# Patient Record
Sex: FEMALE | Race: WHITE | Employment: OTHER | URBAN - METROPOLITAN AREA
[De-identification: names, ages, dates, MRNs, and addresses within clinical notes are randomized per-mention and may not be internally consistent; named-entity substitution may affect disease eponyms.]

---

## 2017-01-04 ENCOUNTER — HOSPITAL ENCOUNTER (OUTPATIENT)
Dept: MAMMOGRAPHY | Age: 62
Discharge: HOME | End: 2017-01-04
Attending: FAMILY MEDICINE
Payer: COMMERCIAL

## 2017-01-04 DIAGNOSIS — Z12.31 ENCOUNTER FOR MAMMOGRAM TO ESTABLISH BASELINE MAMMOGRAM: ICD-10-CM

## 2017-01-16 ENCOUNTER — TELEPHONE (OUTPATIENT)
Dept: SURGERY | Facility: CLINIC | Age: 62
End: 2017-01-16

## 2017-03-29 PROBLEM — L30.9 ECZEMA OF BOTH HANDS: Status: ACTIVE | Noted: 2017-03-29

## 2017-03-29 PROCEDURE — 88175 CYTOPATH C/V AUTO FLUID REDO: CPT | Performed by: FAMILY MEDICINE

## 2017-03-29 PROCEDURE — 87624 HPV HI-RISK TYP POOLED RSLT: CPT | Performed by: FAMILY MEDICINE

## 2017-05-13 ENCOUNTER — HOSPITAL ENCOUNTER (OUTPATIENT)
Dept: MAMMOGRAPHY | Age: 62
Discharge: HOME | End: 2017-05-13
Attending: FAMILY MEDICINE
Payer: COMMERCIAL

## 2017-05-13 DIAGNOSIS — Z12.31 ENCOUNTER FOR SCREENING MAMMOGRAM FOR MALIGNANT NEOPLASM OF BREAST: ICD-10-CM

## 2017-05-13 PROCEDURE — 77067 SCR MAMMO BI INCL CAD: CPT | Performed by: FAMILY MEDICINE

## 2017-05-13 PROCEDURE — 77063 BREAST TOMOSYNTHESIS BI: CPT | Performed by: FAMILY MEDICINE

## 2018-05-19 ENCOUNTER — HOSPITAL ENCOUNTER (OUTPATIENT)
Dept: MAMMOGRAPHY | Age: 63
Discharge: HOME | End: 2018-05-19
Attending: FAMILY MEDICINE
Payer: COMMERCIAL

## 2018-05-19 DIAGNOSIS — Z12.31 ENCOUNTER FOR SCREENING MAMMOGRAM FOR MALIGNANT NEOPLASM OF BREAST: ICD-10-CM

## 2018-05-19 PROCEDURE — 77063 BREAST TOMOSYNTHESIS BI: CPT | Performed by: FAMILY MEDICINE

## 2018-05-19 PROCEDURE — 77067 SCR MAMMO BI INCL CAD: CPT | Performed by: FAMILY MEDICINE

## 2018-06-09 ENCOUNTER — HOSPITAL ENCOUNTER (OUTPATIENT)
Dept: BONE DENSITY | Age: 63
Discharge: HOME | End: 2018-06-09
Attending: FAMILY MEDICINE
Payer: COMMERCIAL

## 2018-06-09 DIAGNOSIS — Z13.820 SCREENING FOR OSTEOPOROSIS: ICD-10-CM

## 2018-06-09 PROCEDURE — 77080 DXA BONE DENSITY AXIAL: CPT | Performed by: FAMILY MEDICINE

## 2018-06-15 NOTE — PROGRESS NOTES
Bone density shows osteopenia. T score of lumbar spine was -2.3, previously was -1.9 on 4/14/16. T score of femur bone at the hip (femoral neck) was 0.1, previously was 4/14/16.     It remains important to continue weightbearing exercise, calcium rich di

## 2018-09-30 PROBLEM — G89.29 CHRONIC BILATERAL LOW BACK PAIN WITH LEFT-SIDED SCIATICA: Status: ACTIVE | Noted: 2018-09-30

## 2018-09-30 PROBLEM — M54.42 CHRONIC BILATERAL LOW BACK PAIN WITH LEFT-SIDED SCIATICA: Status: ACTIVE | Noted: 2018-09-30

## 2019-05-20 ENCOUNTER — HOSPITAL ENCOUNTER (OUTPATIENT)
Dept: MAMMOGRAPHY | Age: 64
Discharge: HOME | End: 2019-05-20
Attending: FAMILY MEDICINE
Payer: COMMERCIAL

## 2019-05-20 DIAGNOSIS — Z12.39 SCREENING FOR BREAST CANCER: ICD-10-CM

## 2019-05-20 PROCEDURE — 77063 BREAST TOMOSYNTHESIS BI: CPT | Performed by: FAMILY MEDICINE

## 2019-05-20 PROCEDURE — 77067 SCR MAMMO BI INCL CAD: CPT | Performed by: FAMILY MEDICINE

## 2019-05-29 NOTE — H&P (VIEW-ONLY)
Desire Coto is a 62year old female who presents for a pre-operative physical exam. Patient is to have left anterior hip replacement, to be done by Dr. Preston Juárez at [facility] on June 24, 2019.       HPI:   The patient is a 18-year-old female who has h Levaquin [Levofloxa*        Comment:Joint pains  Pcn [Bicillin L-A]      UNKNOWN    Comment:Recently used for URI with no reaction   Past Medical History:   Diagnosis Date   • Asthma, mild intermittent 11/11/2009   • Diaphragmatic hernia without mention skin lesions  EYES:denies blurred vision or double vision  HEENT: denies nasal congestion, sinus pain or ST  LUNGS: denies shortness of breath with exertion  CARDIOVASCULAR: denies chest pain on exertion  GI: denies abdominal pain,denies heartburn  : den Absolute 2.52 1.30 - 6.70 10ˆ3/µL    Lymphocytes Absolute 3.59 0.90 - 4.00 10ˆ3/µL    Monocytes Absolute 0.55 0.10 - 0.60 10ˆ3/µL    Eosinophils Absolute 0.09 0.00 - 0.30 10ˆ3/µL    Basophils Absolute 0.07 0.00 - 0.10 10ˆ3/µL    nRBC Absolute 0.000 0.000 - in your health status prior to the day of your surgery, we are available to reevaluate you, if needed.    This consult to be sent back the referring physician, Dr. Matthew Monroy     In reviewing this note, please be advised that Dragon Voice Recognition software used - 1.20 mg/dL 0.39   ALKALINE PHOSPHATASE      50 - 130 U/L 68   AST (SGOT)      0 - 32 U/L 31   ALT (SGPT)      0 - 33 U/L 28   GFR CKD-EPI      >=60.00 mL/min/1.73 m² 103.33   prothrombin Time      8.9 - 11.9 sec 9.9   INR      0.9 - 1.2 ratio 0.9   APTT

## 2019-06-10 ENCOUNTER — HOSPITAL ENCOUNTER (OUTPATIENT)
Dept: PHYSICAL THERAPY | Facility: HOSPITAL | Age: 64
Discharge: HOME | End: 2019-06-10
Attending: ORTHOPAEDIC SURGERY
Payer: COMMERCIAL

## 2019-06-10 ENCOUNTER — LABORATORY ENCOUNTER (OUTPATIENT)
Dept: LAB | Facility: HOSPITAL | Age: 64
End: 2019-06-10
Attending: ORTHOPAEDIC SURGERY
Payer: COMMERCIAL

## 2019-06-10 DIAGNOSIS — M16.12 PRIMARY OSTEOARTHRITIS OF LEFT HIP: ICD-10-CM

## 2019-06-10 PROCEDURE — 86850 RBC ANTIBODY SCREEN: CPT

## 2019-06-10 PROCEDURE — 87081 CULTURE SCREEN ONLY: CPT

## 2019-06-10 PROCEDURE — 86900 BLOOD TYPING SEROLOGIC ABO: CPT

## 2019-06-10 PROCEDURE — 86901 BLOOD TYPING SEROLOGIC RH(D): CPT

## 2019-06-10 PROCEDURE — 81001 URINALYSIS AUTO W/SCOPE: CPT

## 2019-06-24 ENCOUNTER — ANESTHESIA (OUTPATIENT)
Dept: SURGERY | Facility: HOSPITAL | Age: 64
DRG: 470 | End: 2019-06-24
Payer: COMMERCIAL

## 2019-06-24 ENCOUNTER — ANESTHESIA EVENT (OUTPATIENT)
Dept: SURGERY | Facility: HOSPITAL | Age: 64
DRG: 470 | End: 2019-06-24
Payer: COMMERCIAL

## 2019-06-24 ENCOUNTER — HOSPITAL ENCOUNTER (INPATIENT)
Facility: HOSPITAL | Age: 64
LOS: 1 days | Discharge: HOME | DRG: 470 | End: 2019-06-25
Attending: ORTHOPAEDIC SURGERY | Admitting: ORTHOPAEDIC SURGERY
Payer: COMMERCIAL

## 2019-06-24 ENCOUNTER — APPOINTMENT (OUTPATIENT)
Dept: GENERAL RADIOLOGY | Facility: HOSPITAL | Age: 64
DRG: 470 | End: 2019-06-24
Attending: ORTHOPAEDIC SURGERY
Payer: COMMERCIAL

## 2019-06-24 DIAGNOSIS — M16.12 PRIMARY OSTEOARTHRITIS OF LEFT HIP: Primary | ICD-10-CM

## 2019-06-24 PROCEDURE — 97161 PT EVAL LOW COMPLEX 20 MIN: CPT

## 2019-06-24 PROCEDURE — 88341 IMHCHEM/IMCYTCHM EA ADD ANTB: CPT | Performed by: ORTHOPAEDIC SURGERY

## 2019-06-24 PROCEDURE — 88342 IMHCHEM/IMCYTCHM 1ST ANTB: CPT | Performed by: ORTHOPAEDIC SURGERY

## 2019-06-24 PROCEDURE — 3E0T3BZ INTRODUCTION OF ANESTHETIC AGENT INTO PERIPHERAL NERVES AND PLEXI, PERCUTANEOUS APPROACH: ICD-10-PCS | Performed by: ANESTHESIOLOGY

## 2019-06-24 PROCEDURE — 76942 ECHO GUIDE FOR BIOPSY: CPT | Performed by: ORTHOPAEDIC SURGERY

## 2019-06-24 PROCEDURE — 88311 DECALCIFY TISSUE: CPT | Performed by: ORTHOPAEDIC SURGERY

## 2019-06-24 PROCEDURE — 97530 THERAPEUTIC ACTIVITIES: CPT

## 2019-06-24 PROCEDURE — 0SRB01Z REPLACEMENT OF LEFT HIP JOINT WITH METAL SYNTHETIC SUBSTITUTE, OPEN APPROACH: ICD-10-PCS | Performed by: ORTHOPAEDIC SURGERY

## 2019-06-24 PROCEDURE — 88304 TISSUE EXAM BY PATHOLOGIST: CPT | Performed by: ORTHOPAEDIC SURGERY

## 2019-06-24 PROCEDURE — 76000 FLUOROSCOPY <1 HR PHYS/QHP: CPT | Performed by: ORTHOPAEDIC SURGERY

## 2019-06-24 DEVICE — ARTICUL/EZE FEMORAL HEAD DIAMETER 32MM +5 12/14 TAPER
Type: IMPLANTABLE DEVICE | Site: HIP | Status: FUNCTIONAL
Brand: ARTICUL/EZE

## 2019-06-24 DEVICE — CORAIL HIP SYSTEM CEMENTLESS FEMORAL STEM 12/14 AMT 135 DEGREES KA SIZE 9 HA COATED STANDARD COLLAR
Type: IMPLANTABLE DEVICE | Site: HIP | Status: FUNCTIONAL
Brand: CORAIL

## 2019-06-24 DEVICE — PINNACLE POROCOAT ACETABULAR SHELL SECTOR II 48MM OD
Type: IMPLANTABLE DEVICE | Site: HIP | Status: FUNCTIONAL
Brand: PINNACLE POROCOAT

## 2019-06-24 DEVICE — PINNACLE HIP SOLUTIONS ALTRX POLYETHYLENE ACETABULAR LINER NEUTRAL 32MM ID 48MM OD
Type: IMPLANTABLE DEVICE | Site: HIP | Status: FUNCTIONAL
Brand: PINNACLE ALTRX

## 2019-06-24 RX ORDER — DEXAMETHASONE SODIUM PHOSPHATE 4 MG/ML
4 VIAL (ML) INJECTION AS NEEDED
Status: DISCONTINUED | OUTPATIENT
Start: 2019-06-24 | End: 2019-06-24 | Stop reason: HOSPADM

## 2019-06-24 RX ORDER — CLINDAMYCIN PHOSPHATE 900 MG/50ML
INJECTION INTRAVENOUS
Status: DISPENSED
Start: 2019-06-24 | End: 2019-06-24

## 2019-06-24 RX ORDER — DIPHENHYDRAMINE HYDROCHLORIDE 50 MG/ML
12.5 INJECTION INTRAMUSCULAR; INTRAVENOUS EVERY 4 HOURS PRN
Status: DISCONTINUED | OUTPATIENT
Start: 2019-06-24 | End: 2019-06-25

## 2019-06-24 RX ORDER — METOCLOPRAMIDE HYDROCHLORIDE 5 MG/ML
10 INJECTION INTRAMUSCULAR; INTRAVENOUS EVERY 6 HOURS PRN
Status: DISCONTINUED | OUTPATIENT
Start: 2019-06-24 | End: 2019-06-25

## 2019-06-24 RX ORDER — SODIUM CHLORIDE 9 MG/ML
INJECTION, SOLUTION INTRAVENOUS CONTINUOUS
Status: DISCONTINUED | OUTPATIENT
Start: 2019-06-24 | End: 2019-06-25

## 2019-06-24 RX ORDER — ASPIRIN 325 MG
325 TABLET ORAL 2 TIMES DAILY
Status: DISCONTINUED | OUTPATIENT
Start: 2019-06-24 | End: 2019-06-25

## 2019-06-24 RX ORDER — SODIUM PHOSPHATE, DIBASIC AND SODIUM PHOSPHATE, MONOBASIC 7; 19 G/133ML; G/133ML
1 ENEMA RECTAL ONCE AS NEEDED
Status: DISCONTINUED | OUTPATIENT
Start: 2019-06-24 | End: 2019-06-25

## 2019-06-24 RX ORDER — MONTELUKAST SODIUM 10 MG/1
10 TABLET ORAL DAILY PRN
Status: DISCONTINUED | OUTPATIENT
Start: 2019-06-24 | End: 2019-06-25

## 2019-06-24 RX ORDER — DIPHENHYDRAMINE HYDROCHLORIDE 50 MG/ML
25 INJECTION INTRAMUSCULAR; INTRAVENOUS ONCE AS NEEDED
Status: ACTIVE | OUTPATIENT
Start: 2019-06-24 | End: 2019-06-24

## 2019-06-24 RX ORDER — DOCUSATE SODIUM 100 MG/1
100 CAPSULE, LIQUID FILLED ORAL 2 TIMES DAILY
Qty: 60 CAPSULE | Refills: 0 | Status: SHIPPED | OUTPATIENT
Start: 2019-06-24 | End: 2020-04-13 | Stop reason: ALTCHOICE

## 2019-06-24 RX ORDER — ACETAMINOPHEN 325 MG/1
650 TABLET ORAL 4 TIMES DAILY
Status: COMPLETED | OUTPATIENT
Start: 2019-06-24 | End: 2019-06-24

## 2019-06-24 RX ORDER — CLINDAMYCIN PHOSPHATE 900 MG/50ML
900 INJECTION INTRAVENOUS ONCE
Status: DISCONTINUED | OUTPATIENT
Start: 2019-06-24 | End: 2019-06-24 | Stop reason: HOSPADM

## 2019-06-24 RX ORDER — PROCHLORPERAZINE EDISYLATE 5 MG/ML
10 INJECTION INTRAMUSCULAR; INTRAVENOUS EVERY 6 HOURS PRN
Status: DISCONTINUED | OUTPATIENT
Start: 2019-06-24 | End: 2019-06-25

## 2019-06-24 RX ORDER — DOCUSATE SODIUM 100 MG/1
100 CAPSULE, LIQUID FILLED ORAL 2 TIMES DAILY
Status: DISCONTINUED | OUTPATIENT
Start: 2019-06-24 | End: 2019-06-25

## 2019-06-24 RX ORDER — SODIUM CHLORIDE, SODIUM LACTATE, POTASSIUM CHLORIDE, CALCIUM CHLORIDE 600; 310; 30; 20 MG/100ML; MG/100ML; MG/100ML; MG/100ML
INJECTION, SOLUTION INTRAVENOUS CONTINUOUS
Status: DISCONTINUED | OUTPATIENT
Start: 2019-06-24 | End: 2019-06-24 | Stop reason: HOSPADM

## 2019-06-24 RX ORDER — CLINDAMYCIN PHOSPHATE 900 MG/50ML
900 INJECTION INTRAVENOUS EVERY 8 HOURS
Status: COMPLETED | OUTPATIENT
Start: 2019-06-24 | End: 2019-06-25

## 2019-06-24 RX ORDER — CELECOXIB 200 MG/1
200 CAPSULE ORAL 2 TIMES DAILY
Qty: 60 CAPSULE | Refills: 0 | Status: SHIPPED | OUTPATIENT
Start: 2019-06-24 | End: 2019-07-30

## 2019-06-24 RX ORDER — HYDROMORPHONE HYDROCHLORIDE 1 MG/ML
INJECTION, SOLUTION INTRAMUSCULAR; INTRAVENOUS; SUBCUTANEOUS
Status: COMPLETED
Start: 2019-06-24 | End: 2019-06-24

## 2019-06-24 RX ORDER — ZOLPIDEM TARTRATE 5 MG/1
5 TABLET ORAL NIGHTLY PRN
Status: DISCONTINUED | OUTPATIENT
Start: 2019-06-24 | End: 2019-06-25

## 2019-06-24 RX ORDER — FAMOTIDINE 20 MG/1
40 TABLET ORAL NIGHTLY PRN
Status: DISCONTINUED | OUTPATIENT
Start: 2019-06-24 | End: 2019-06-25

## 2019-06-24 RX ORDER — OXYCODONE HYDROCHLORIDE 15 MG/1
15 TABLET ORAL EVERY 4 HOURS PRN
Status: ACTIVE | OUTPATIENT
Start: 2019-06-24 | End: 2019-06-25

## 2019-06-24 RX ORDER — TIZANIDINE 4 MG/1
4 TABLET ORAL 3 TIMES DAILY PRN
Status: DISCONTINUED | OUTPATIENT
Start: 2019-06-24 | End: 2019-06-25

## 2019-06-24 RX ORDER — MIDAZOLAM HYDROCHLORIDE 1 MG/ML
1 INJECTION INTRAMUSCULAR; INTRAVENOUS EVERY 5 MIN PRN
Status: DISCONTINUED | OUTPATIENT
Start: 2019-06-24 | End: 2019-06-24 | Stop reason: HOSPADM

## 2019-06-24 RX ORDER — KETOROLAC TROMETHAMINE 30 MG/ML
30 INJECTION, SOLUTION INTRAMUSCULAR; INTRAVENOUS EVERY 6 HOURS
Status: COMPLETED | OUTPATIENT
Start: 2019-06-24 | End: 2019-06-25

## 2019-06-24 RX ORDER — POLYETHYLENE GLYCOL 3350 17 G/17G
17 POWDER, FOR SOLUTION ORAL DAILY PRN
Status: DISCONTINUED | OUTPATIENT
Start: 2019-06-24 | End: 2019-06-25

## 2019-06-24 RX ORDER — OXYCODONE HYDROCHLORIDE 5 MG/1
5 TABLET ORAL EVERY 4 HOURS PRN
Status: ACTIVE | OUTPATIENT
Start: 2019-06-24 | End: 2019-06-25

## 2019-06-24 RX ORDER — ACETAMINOPHEN 325 MG/1
TABLET ORAL
Status: COMPLETED
Start: 2019-06-24 | End: 2019-06-24

## 2019-06-24 RX ORDER — HYDROMORPHONE HYDROCHLORIDE 1 MG/ML
0.8 INJECTION, SOLUTION INTRAMUSCULAR; INTRAVENOUS; SUBCUTANEOUS EVERY 2 HOUR PRN
Status: DISCONTINUED | OUTPATIENT
Start: 2019-06-24 | End: 2019-06-25

## 2019-06-24 RX ORDER — SODIUM CHLORIDE, SODIUM LACTATE, POTASSIUM CHLORIDE, CALCIUM CHLORIDE 600; 310; 30; 20 MG/100ML; MG/100ML; MG/100ML; MG/100ML
INJECTION, SOLUTION INTRAVENOUS CONTINUOUS
Status: DISCONTINUED | OUTPATIENT
Start: 2019-06-24 | End: 2019-06-25

## 2019-06-24 RX ORDER — BISACODYL 10 MG
10 SUPPOSITORY, RECTAL RECTAL
Status: DISCONTINUED | OUTPATIENT
Start: 2019-06-24 | End: 2019-06-25

## 2019-06-24 RX ORDER — HYDROCODONE BITARTRATE AND ACETAMINOPHEN 10; 325 MG/1; MG/1
1 TABLET ORAL EVERY 4 HOURS PRN
Status: DISCONTINUED | OUTPATIENT
Start: 2019-06-25 | End: 2019-06-25

## 2019-06-24 RX ORDER — HYDROMORPHONE HYDROCHLORIDE 1 MG/ML
0.4 INJECTION, SOLUTION INTRAMUSCULAR; INTRAVENOUS; SUBCUTANEOUS EVERY 2 HOUR PRN
Status: DISCONTINUED | OUTPATIENT
Start: 2019-06-24 | End: 2019-06-25

## 2019-06-24 RX ORDER — ONDANSETRON 2 MG/ML
4 INJECTION INTRAMUSCULAR; INTRAVENOUS EVERY 4 HOURS PRN
Status: DISCONTINUED | OUTPATIENT
Start: 2019-06-24 | End: 2019-06-25

## 2019-06-24 RX ORDER — HYDROCODONE BITARTRATE AND ACETAMINOPHEN 10; 325 MG/1; MG/1
2 TABLET ORAL EVERY 4 HOURS PRN
Status: DISCONTINUED | OUTPATIENT
Start: 2019-06-25 | End: 2019-06-25

## 2019-06-24 RX ORDER — DIPHENHYDRAMINE HCL 25 MG
25 CAPSULE ORAL EVERY 4 HOURS PRN
Status: DISCONTINUED | OUTPATIENT
Start: 2019-06-24 | End: 2019-06-25

## 2019-06-24 RX ORDER — TRAMADOL HYDROCHLORIDE 50 MG/1
50 TABLET ORAL EVERY 6 HOURS
Status: DISCONTINUED | OUTPATIENT
Start: 2019-06-24 | End: 2019-06-25

## 2019-06-24 RX ORDER — NALOXONE HYDROCHLORIDE 0.4 MG/ML
80 INJECTION, SOLUTION INTRAMUSCULAR; INTRAVENOUS; SUBCUTANEOUS AS NEEDED
Status: DISCONTINUED | OUTPATIENT
Start: 2019-06-24 | End: 2019-06-24 | Stop reason: HOSPADM

## 2019-06-24 RX ORDER — HYDROCODONE BITARTRATE AND ACETAMINOPHEN 5; 325 MG/1; MG/1
1 TABLET ORAL EVERY 4 HOURS PRN
Qty: 40 TABLET | Refills: 0 | Status: SHIPPED | OUTPATIENT
Start: 2019-06-24 | End: 2019-06-25

## 2019-06-24 RX ORDER — SCOLOPAMINE TRANSDERMAL SYSTEM 1 MG/1
1 PATCH, EXTENDED RELEASE TRANSDERMAL ONCE
Status: DISCONTINUED | OUTPATIENT
Start: 2019-06-24 | End: 2019-06-25

## 2019-06-24 RX ORDER — HYDROMORPHONE HYDROCHLORIDE 1 MG/ML
0.2 INJECTION, SOLUTION INTRAMUSCULAR; INTRAVENOUS; SUBCUTANEOUS EVERY 2 HOUR PRN
Status: DISCONTINUED | OUTPATIENT
Start: 2019-06-24 | End: 2019-06-25

## 2019-06-24 RX ORDER — ONDANSETRON 2 MG/ML
4 INJECTION INTRAMUSCULAR; INTRAVENOUS AS NEEDED
Status: DISCONTINUED | OUTPATIENT
Start: 2019-06-24 | End: 2019-06-24 | Stop reason: HOSPADM

## 2019-06-24 RX ORDER — OXYCODONE HYDROCHLORIDE 10 MG/1
10 TABLET ORAL EVERY 4 HOURS PRN
Status: ACTIVE | OUTPATIENT
Start: 2019-06-24 | End: 2019-06-25

## 2019-06-24 RX ORDER — MEPERIDINE HYDROCHLORIDE 25 MG/ML
12.5 INJECTION INTRAMUSCULAR; INTRAVENOUS; SUBCUTANEOUS AS NEEDED
Status: DISCONTINUED | OUTPATIENT
Start: 2019-06-24 | End: 2019-06-24 | Stop reason: HOSPADM

## 2019-06-24 RX ORDER — ACETAMINOPHEN 325 MG/1
650 TABLET ORAL ONCE
Status: COMPLETED | OUTPATIENT
Start: 2019-06-24 | End: 2019-06-24

## 2019-06-24 RX ORDER — SENNOSIDES 8.6 MG
17.2 TABLET ORAL NIGHTLY
Status: DISCONTINUED | OUTPATIENT
Start: 2019-06-24 | End: 2019-06-25

## 2019-06-24 RX ORDER — ACETAMINOPHEN 500 MG
1000 TABLET ORAL ONCE
Status: DISCONTINUED | OUTPATIENT
Start: 2019-06-24 | End: 2019-06-24 | Stop reason: HOSPADM

## 2019-06-24 RX ORDER — MULTIVITAMIN WITH FOLIC ACID 400 MCG
1 TABLET ORAL DAILY
Status: ON HOLD | COMMUNITY
End: 2019-06-24

## 2019-06-24 NOTE — PHYSICAL THERAPY NOTE
PT orders received, chart reviewed, and spoke with RN. Per RN, pt is unable to lift the leg up. Will hold PT eval until pt is appropriate. RN aware.  CM

## 2019-06-24 NOTE — PLAN OF CARE
Patient a/ox4, rates pain 3-5/10 , started on IV medications, will start PO when tolerating oral intake. PT attempted to get patient up and out of bed, block and spinal still in effect, per PT, unsafe to get patient up at this time.  Will attempt to get p

## 2019-06-24 NOTE — ANESTHESIA POSTPROCEDURE EVALUATION
[address] Patient Status:  Surgery Admit - Inpt   Age/Gender 62year old female MRN [id number]   [facility] SURGERY Attending Nicky May MD   Hosp Day # 0 PCP Evy Ruelas MD       Anesthesia Post-op Note    Proce

## 2019-06-24 NOTE — PHYSICAL THERAPY NOTE
PHYSICAL THERAPY HIP EVALUATION - INPATIENT     Room Number: 385/385-A  Evaluation Date: 6/24/2019  Type of Evaluation: Initial  Physician Order: PT Eval and Treat    Presenting Problem: L anterior total hip replacement  Reason for Therapy: Mobility Dysfun 16  Railing: Yes    Lives With: Spouse  Drives: Yes  Patient Owned Equipment: Rolling walker  Patient Regularly Uses: Reading glasses    Prior Level of [place]: [facility] are multiple flights of stairs.  Prior Little   -   Moving from lying on back to sitting on the side of the bed?: A Little   How much help from another person does the patient currently need. ..   -   Moving to and from a bed to a chair (including a wheelchair)?: A Little   -   Need to walk in h after session. Increased time spent on discussion with pt regarding inpatient PT goals, group therapy sessions, and DC recommendation of home with HHPT. Pt in understanding.      Exercise/Education Provided:  Bed mobility  Body mechanics  Functional act Goals: 5      CURRENT GOALS  Goal #1  Patient is able to demonstrate supine - sit EOB @ level: supervision     Goal #2  Patient is able to demonstrate transfers Sit to/from Stand at assistance level: supervison     Goal #3    Patient is able to ambulate 15

## 2019-06-24 NOTE — OPERATIVE REPORT
TOTAL HIP REPLACEMENT OPERATIVE REPORT    Hilton Brown       [id number]     3/6/1955    PRE-OP DX:  LEFT HIP PRIMARY OSTEOARTHRITIS  POST-OP DX:  LEFT HIP PRIMARY OSTEOARTHRITIS  PROCEDURE:  DIRECT ANTERIOR LEFT TOTAL HIP REPLACEMENT  SURGEON:  Annie Johnson OSTEOTOMY WAS MADE. FEMORAL HEAD WAS REMOVED WITH A CORK SCREW. POSTERIOR AND INFERIOR ACETABULAR RETRACTORS WERE PLACED CAREFULLY. GOOD ACETABULAR EXPOSURE WAS OBTAINED. LABRAL TISSUE WAS EXCISED.   MEDIAL WALL WAS IDENTIFIED AND CLEARED OF SOFT TIS STERILE CONDITION. HIP WAS FLEXED TO 90 AND ADD/IR TO 45 DEG. HIP WAS FULLY EXTENDED AND ER TO 90. HIP WAS FELT TO BE STABLE WITH GOOD TENSION. ALL THE TRIAL IMPLANTS WERE REMOVED. WOUND WAS IRRIGATED COPIOUSLY. HEMOSTASIS WAS OBTAINED.   Nikki Diaz

## 2019-06-24 NOTE — PROGRESS NOTES
Sonido Crowe MD       SURGERY, ORTHOPEDIC   Primary osteoarthritis of left hip       Dx   Follow - Up         Reason for Visit               Reason for Visit     Follow - Up F/U Left hip OA      Progress Notes        Patient follows up for her left hip I feel that she is a candidate for hip replacement surgery. She has failed reasonable conservative care. Explained to her what hip replacement means. Hospital course, recovery process, expected and limitations after surgery were discussed.   Risks of the

## 2019-06-24 NOTE — INTERVAL H&P NOTE
Pre-op Diagnosis: Primary osteoarthritis of left hip [M16.12]    The above referenced H&P was reviewed by Nivia Desir MD on 6/24/2019, the patient was examined and no significant changes have occurred in the patient's condition since the H&P was performed.

## 2019-06-24 NOTE — ANESTHESIA PREPROCEDURE EVALUATION
PRE-OP EVALUATION    Patient Name: Hair Tomas    Pre-op Diagnosis: Primary osteoarthritis of left hip [M16.12]    Procedure(s):  LEFT ANTERIOR HIP REPLACEMENT    Surgeon(s) and Role:     Darlene Rowley MD - Primary    Pre-op vitals reviewed.   Temp: Take 1 tablet by mouth 3 (three) times daily as needed for Pain. Disp: 60 tablet Rfl: 1   Probiotic Product (PROBIOTIC DAILY OR) Take 1 capsule by mouth daily.  Disp:  Rfl:    Ranitidine HCl (ZANTAC) 300 MG Oral Tab Take 1 tablet by mouth nightly as needed 05/29/2019    MCHC 32.5 05/29/2019    RDW 13.4 05/29/2019     05/29/2019     Lab Results   Component Value Date     05/29/2019    K 3.87 05/29/2019     05/29/2019    CO2 29.4 (H) 05/29/2019    BUN 12.0 05/29/2019    CREATSERUM 0.50 05/29

## 2019-06-25 VITALS
BODY MASS INDEX: 27.27 KG/M2 | TEMPERATURE: 99 F | HEART RATE: 89 BPM | RESPIRATION RATE: 16 BRPM | OXYGEN SATURATION: 95 % | DIASTOLIC BLOOD PRESSURE: 63 MMHG | SYSTOLIC BLOOD PRESSURE: 104 MMHG | HEIGHT: 62.5 IN | WEIGHT: 152 LBS

## 2019-06-25 PROCEDURE — 97535 SELF CARE MNGMENT TRAINING: CPT

## 2019-06-25 PROCEDURE — 97116 GAIT TRAINING THERAPY: CPT

## 2019-06-25 PROCEDURE — 97530 THERAPEUTIC ACTIVITIES: CPT

## 2019-06-25 PROCEDURE — 97150 GROUP THERAPEUTIC PROCEDURES: CPT

## 2019-06-25 PROCEDURE — 85027 COMPLETE CBC AUTOMATED: CPT | Performed by: ORTHOPAEDIC SURGERY

## 2019-06-25 PROCEDURE — 97165 OT EVAL LOW COMPLEX 30 MIN: CPT

## 2019-06-25 RX ORDER — ACETAMINOPHEN AND CODEINE PHOSPHATE 300; 30 MG/1; MG/1
2 TABLET ORAL EVERY 4 HOURS PRN
Status: DISCONTINUED | OUTPATIENT
Start: 2019-06-25 | End: 2019-06-25

## 2019-06-25 RX ORDER — TIZANIDINE 2 MG/1
1 TABLET ORAL 3 TIMES DAILY PRN
Status: DISCONTINUED | OUTPATIENT
Start: 2019-06-25 | End: 2019-06-25

## 2019-06-25 RX ORDER — TRAMADOL HYDROCHLORIDE 50 MG/1
50 TABLET ORAL EVERY 6 HOURS PRN
Status: DISCONTINUED | OUTPATIENT
Start: 2019-06-25 | End: 2019-06-25

## 2019-06-25 RX ORDER — ACETAMINOPHEN AND CODEINE PHOSPHATE 300; 30 MG/1; MG/1
1-2 TABLET ORAL EVERY 4 HOURS PRN
Qty: 40 TABLET | Refills: 0 | Status: SHIPPED | OUTPATIENT
Start: 2019-06-25 | End: 2020-04-13 | Stop reason: ALTCHOICE

## 2019-06-25 RX ORDER — ACETAMINOPHEN AND CODEINE PHOSPHATE 300; 30 MG/1; MG/1
1 TABLET ORAL EVERY 4 HOURS PRN
Status: DISCONTINUED | OUTPATIENT
Start: 2019-06-25 | End: 2019-06-25

## 2019-06-25 NOTE — PROGRESS NOTES
[facility] Hospitalist Progress Note     [facility]      SUBJECTIVE:  No CP, SOB  Had some nausea with norco, has tolerated tylenol 3 previouusly  Did have BM, tolerating diet    OBJECTIVE:  Temp:  [97.6 °F (36.4 °C)-99.3 °F (37.4 °C)] 98.7 °F (37.1 °C)  P surgical planning. If additional diagnostic imaging needed, consider followup with standard imaging exams.     Dictated by: Nya Pérez MD on 6/24/2019 at 11:02     Approved by: Nya Pérez MD               Meds:     Prior to Admission Medications: 0.9% NaCl infusion  Intravenous Continuous   aspirin tab 325 mg 325 mg Oral BID   HYDROmorphone HCl (DILAUDID) 1 MG/ML injection 0.2 mg 0.2 mg Intravenous Q2H PRN   Or      HYDROmorphone HCl (DILAUDID) 1 MG/ML injection 0.4 mg 0.4 mg Intravenous Q2H PRN

## 2019-06-25 NOTE — PHYSICAL THERAPY NOTE
PHYSICAL THERAPY HIP TREATMENT NOTE - INPATIENT      Room Number: 385/385-A     Session: 2 and 3  Number of Visits to Meet Established Goals: 5    Presenting Problem: L anterior total hip replacement    Problem List  Active Problems:    Primary osteoarthri Lower Extremity: Weight Bearing as Tolerated    PAIN ASSESSMENT   Rating: 3  Location: left hip  Management Techniques:  Activity promotion    BALANCE  Static Sitting: Good  Dynamic Sitting: Good  Static Standing: Fair -  Dynamic Standing: Fair -  ACTIVITY 300 feet with sup assist. Pt needs cues to correct gait pattern and cues to maintain hip precautions during mobility. Hip precautions recalled. Pt was trained on stairs with sup for safety.  Pt negotiated  8 number of steps with rod rails via step to gait Goal #4     Patient will negotiate 4 stairs/one curb w/ assistive device and supervision  Met    Goal #5     Patient verbalizes and/or demonstrates all precautions and safety concerns independently  Met    Goal #6           Goal Comments: Goals establish

## 2019-06-25 NOTE — PROGRESS NOTES
[address] Patient Status:  Inpatient    3/6/1955 MRN [id number]   [facility] 3SW-A Attending Rosa Tobias MD   [address] Day # 1 PCP Idania Moran MD         S:  Patient doing well. No nausea. No SOB, CP or calf pain.

## 2019-06-25 NOTE — CONSULTS
General Medicine Consult      Reason for consult: post-op medical management     Consulted by: Dr. Lillie Perez    PCP: Jimenez Lomeli MD      History of Present Illness: Patient is a 62year old female with PMH sig for seasonal allergies, GERD, OA who presented for mouth daily. Disp:  Rfl:    Cholecalciferol (VITAMIN D-3 OR) Take 1 tablet by mouth daily. Disp:  Rfl:    HYDROcodone-acetaminophen (NORCO) 5-325 MG Oral Tab Take 1 tablet by mouth every 4 (four) hours as needed for Pain.  Disp: 40 tablet Rfl: 0   docusate HCl, Prochlorperazine Edisylate, diphenhydrAMINE HCl, diphenhydrAMINE **OR** diphenhydrAMINE HCl, tiZANidine HCl, oxyCODONE HCl **OR** oxyCODONE HCl **OR** oxyCODONE HCl, HYDROmorphone HCl **OR** HYDROmorphone HCl **OR** HYDROmorphone HCl, [START ON 6/25/2 results for input(s): WBC, HGB, MCV, PLT, BAND, INR in the last 168 hours. Invalid input(s): LYM#, MONO#, BASOS#, EOSIN#    No results for input(s): NA, K, CL, CO2, BUN, CREATSERUM, GLU, CA, CAION, MG, PHOS in the last 168 hours.     No results for input

## 2019-06-25 NOTE — CM/SW NOTE
06/25/19 1000   CM/SW Screening   Referral [address] staff; Chart review;Nursing rounds   Patient's Home Environment [facility]   Number of Levels in Home Other (comment)  (multi)   Number of Stair in Home 7   Patient lives

## 2019-06-25 NOTE — PHYSICAL THERAPY NOTE
PHYSICAL THERAPY HIP TREATMENT NOTE - INPATIENT      Room Number: 385/385-A     Session: 1  Number of Visits to Meet Established Goals: 5    Presenting Problem: L anterior total hip replacement    Problem List  Active Problems:    Primary osteoarthritis of Extremity: Weight Bearing as Tolerated    PAIN ASSESSMENT   Ratin  Location: left hip  Management Techniques:  Activity promotion    BALANCE  Static Sitting: Good  Dynamic Sitting: Good  Static [address] -  Dynamic [address]  [facility] in chair; With [facility] staff;Call light within reach; Needs met; All patient questions and concerns addressed(woriking with OT)    ASSESSMENT   Pt demonstrating improved left LE control and now appears appropriate for group PT this AM, is doing well.   Pt cont to p

## 2019-06-25 NOTE — PLAN OF CARE
Left hip with Aquacel dressing in place, clean, dry and intact. Verbalized has full sensation of her left lower leg,  denies numbness or tingling sensation to left leg.   Given Tramadol this morning, well tolerated, verbalized has minimal pain to left hip

## 2019-06-25 NOTE — OCCUPATIONAL THERAPY NOTE
OCCUPATIONAL THERAPY QUICK EVALUATION - INPATIENT    Room Number: 385/385-A  Evaluation Date: 6/25/2019     Type of Evaluation: Quick Eval  Presenting Problem: s/p L LILIA 6/24/19    Physician Order: IP Consult to Occupational Therapy  Reason for Therapy:  A SITUATION  Type of Home: Townhouse  Home Layout: Multi-level  Lives With: Spouse    Toilet and Equipment: Comfort height toilet; Toilet riser with arms  Shower/Tub and Equipment: Walk-in shower  Other Equipment: None    Occupation/Status: Retired     Drives Supervision    Skilled Therapy Provided:  Activities performed this session include: Education on hip precautions and incorporation into ADLs; patient required minimal cueing to follow throughout session; education on bed mobility, performed Mod I; educatio problem-focused assessments, limited treatment options    Overall Complexity  LOW     OT Discharge Recommendations: Home(with family assist)  OT Device Recommendations: Valdo bars    PLAN   Patient has been evaluated and presents with no skilled [place]

## 2019-06-25 NOTE — PROGRESS NOTES
Patient and (Aurelio) attended group discharge education class. Discharge education provided utilizing \"hip/knee replacement discharge instructions\" sheet. Teach back done. Questions solicited and answered. Tolerated activity well.

## 2019-06-25 NOTE — PLAN OF CARE
Patient alert and oriented ,pain is well controlled with pain protocol ,not taking any additional pain meds ,gets up with min assist with walker ,still has some numbness to the thigh area ,using knee immobilizer to get up ,dressing clean and dry to left hi

## 2019-06-26 PROBLEM — Z96.642 S/P HIP REPLACEMENT, LEFT: Status: ACTIVE | Noted: 2019-06-26

## 2019-06-26 PROBLEM — M25.552 LEFT HIP PAIN: Status: ACTIVE | Noted: 2019-06-26

## 2020-03-03 ENCOUNTER — HOSPITAL ENCOUNTER (OUTPATIENT)
Dept: CT IMAGING | Age: 65
Discharge: HOME | End: 2020-03-03
Attending: FAMILY MEDICINE

## 2020-03-03 DIAGNOSIS — Z13.9 ENCOUNTER FOR SCREENING: ICD-10-CM

## 2020-03-05 PROBLEM — R93.1 AGATSTON CORONARY ARTERY CALCIUM SCORE LESS THAN 100: Status: ACTIVE | Noted: 2020-03-04

## 2020-06-11 ENCOUNTER — HOSPITAL ENCOUNTER (OUTPATIENT)
Dept: BONE DENSITY | Age: 65
Discharge: HOME | End: 2020-06-11
Attending: FAMILY MEDICINE
Payer: COMMERCIAL

## 2020-06-11 DIAGNOSIS — Z13.820 SCREENING FOR OSTEOPOROSIS: ICD-10-CM

## 2020-06-11 PROCEDURE — 77080 DXA BONE DENSITY AXIAL: CPT | Performed by: FAMILY MEDICINE

## 2020-06-13 ENCOUNTER — HOSPITAL ENCOUNTER (OUTPATIENT)
Dept: MAMMOGRAPHY | Age: 65
Discharge: HOME | End: 2020-06-13
Attending: FAMILY MEDICINE
Payer: COMMERCIAL

## 2020-06-13 DIAGNOSIS — Z12.31 ENCOUNTER FOR SCREENING MAMMOGRAM FOR MALIGNANT NEOPLASM OF BREAST: ICD-10-CM

## 2020-06-13 PROCEDURE — 77063 BREAST TOMOSYNTHESIS BI: CPT | Performed by: FAMILY MEDICINE

## 2020-06-13 PROCEDURE — 77067 SCR MAMMO BI INCL CAD: CPT | Performed by: FAMILY MEDICINE

## 2020-06-15 NOTE — PROGRESS NOTES
Bone density shows improving osteopenia. T score of lumbar spine was -1.9, previously was -2.3 on 6/9/2018  T score of femur bone at the hip (femoral neck) was -0.6, previously was 0.1. I recommend continuing alendronate.   It remains important to cont

## 2021-02-09 DIAGNOSIS — Z23 NEED FOR VACCINATION: ICD-10-CM

## 2021-02-10 ENCOUNTER — IMMUNIZATION (OUTPATIENT)
Dept: LAB | Age: 66
End: 2021-02-10
Attending: HOSPITALIST
Payer: COMMERCIAL

## 2021-02-10 DIAGNOSIS — Z23 NEED FOR VACCINATION: Primary | ICD-10-CM

## 2021-02-10 PROCEDURE — 0001A SARSCOV2 VAC 30MCG/0.3ML IM: CPT

## 2021-03-03 ENCOUNTER — IMMUNIZATION (OUTPATIENT)
Dept: LAB | Age: 66
End: 2021-03-03
Attending: HOSPITALIST
Payer: MEDICARE

## 2021-03-03 DIAGNOSIS — Z23 NEED FOR VACCINATION: Primary | ICD-10-CM

## 2021-03-03 PROCEDURE — 0002A SARSCOV2 VAC 30MCG/0.3ML IM: CPT

## 2021-06-26 ENCOUNTER — HOSPITAL ENCOUNTER (OUTPATIENT)
Dept: MAMMOGRAPHY | Age: 66
Discharge: HOME | End: 2021-06-26
Attending: FAMILY MEDICINE
Payer: MEDICARE

## 2021-06-26 DIAGNOSIS — Z12.31 ENCOUNTER FOR SCREENING MAMMOGRAM FOR MALIGNANT NEOPLASM OF BREAST: ICD-10-CM

## 2021-06-26 PROCEDURE — 77067 SCR MAMMO BI INCL CAD: CPT | Performed by: FAMILY MEDICINE

## 2021-06-26 PROCEDURE — 77063 BREAST TOMOSYNTHESIS BI: CPT | Performed by: FAMILY MEDICINE

## 2021-06-28 NOTE — PROGRESS NOTES
Radiology is requesting additional views of the right breast and will reach out to schedule this with you.

## 2021-07-02 ENCOUNTER — HOSPITAL ENCOUNTER (OUTPATIENT)
Dept: MAMMOGRAPHY | Age: 66
Discharge: HOME | End: 2021-07-02
Attending: FAMILY MEDICINE
Payer: MEDICARE

## 2021-07-02 DIAGNOSIS — R92.2 INCONCLUSIVE MAMMOGRAM: ICD-10-CM

## 2021-07-02 PROCEDURE — 77065 DX MAMMO INCL CAD UNI: CPT | Performed by: FAMILY MEDICINE

## 2021-07-02 PROCEDURE — 77061 BREAST TOMOSYNTHESIS UNI: CPT | Performed by: FAMILY MEDICINE

## 2021-07-02 NOTE — PROGRESS NOTES
Additional imaging of the right breast was benign. Radiology recommends annual screening mammogram, next due in 12 months.

## 2021-09-15 PROBLEM — R51.9 HEADACHE IN FRONT OF HEAD: Status: ACTIVE | Noted: 2021-09-15

## 2021-09-15 PROBLEM — J32.1 CHRONIC FRONTAL SINUSITIS: Status: ACTIVE | Noted: 2021-09-15

## 2022-01-02 PROBLEM — M25.552 LEFT HIP PAIN: Status: RESOLVED | Noted: 2019-06-26 | Resolved: 2022-01-02

## 2022-03-07 PROBLEM — R73.03 PREDIABETES: Status: ACTIVE | Noted: 2022-03-07

## 2022-06-29 ENCOUNTER — HOSPITAL ENCOUNTER (OUTPATIENT)
Dept: MAMMOGRAPHY | Age: 67
Discharge: HOME | End: 2022-06-29
Attending: FAMILY MEDICINE
Payer: MEDICARE

## 2022-06-29 ENCOUNTER — HOSPITAL ENCOUNTER (OUTPATIENT)
Dept: BONE DENSITY | Age: 67
Discharge: HOME | End: 2022-06-29
Attending: FAMILY MEDICINE
Payer: MEDICARE

## 2022-06-29 DIAGNOSIS — Z78.0 ENCOUNTER FOR OSTEOPOROSIS SCREENING IN ASYMPTOMATIC POSTMENOPAUSAL PATIENT: ICD-10-CM

## 2022-06-29 DIAGNOSIS — Z13.820 ENCOUNTER FOR OSTEOPOROSIS SCREENING IN ASYMPTOMATIC POSTMENOPAUSAL PATIENT: ICD-10-CM

## 2022-06-29 DIAGNOSIS — M85.80 OSTEOPENIA, UNSPECIFIED LOCATION: ICD-10-CM

## 2022-06-29 DIAGNOSIS — Z12.31 ENCOUNTER FOR SCREENING MAMMOGRAM FOR MALIGNANT NEOPLASM OF BREAST: ICD-10-CM

## 2022-06-29 PROCEDURE — 77063 BREAST TOMOSYNTHESIS BI: CPT | Performed by: FAMILY MEDICINE

## 2022-06-29 PROCEDURE — 77080 DXA BONE DENSITY AXIAL: CPT | Performed by: FAMILY MEDICINE

## 2022-06-29 PROCEDURE — 77067 SCR MAMMO BI INCL CAD: CPT | Performed by: FAMILY MEDICINE

## 2023-06-30 ENCOUNTER — HOSPITAL ENCOUNTER (OUTPATIENT)
Dept: MAMMOGRAPHY | Age: 68
Discharge: HOME | End: 2023-06-30
Attending: FAMILY MEDICINE
Payer: MEDICARE

## 2023-06-30 DIAGNOSIS — Z12.31 ENCOUNTER FOR SCREENING MAMMOGRAM FOR MALIGNANT NEOPLASM OF BREAST: ICD-10-CM

## 2023-06-30 PROCEDURE — 77063 BREAST TOMOSYNTHESIS BI: CPT | Performed by: FAMILY MEDICINE

## 2023-06-30 PROCEDURE — 77067 SCR MAMMO BI INCL CAD: CPT | Performed by: FAMILY MEDICINE

## 2024-07-10 ENCOUNTER — HOSPITAL ENCOUNTER (OUTPATIENT)
Dept: MAMMOGRAPHY | Age: 69
Discharge: HOME | End: 2024-07-10
Attending: FAMILY MEDICINE
Payer: MEDICARE

## 2024-07-10 DIAGNOSIS — Z12.31 ENCOUNTER FOR SCREENING MAMMOGRAM FOR MALIGNANT NEOPLASM OF BREAST: ICD-10-CM

## 2024-07-10 PROCEDURE — 77067 SCR MAMMO BI INCL CAD: CPT | Performed by: FAMILY MEDICINE

## 2024-07-10 PROCEDURE — 77063 BREAST TOMOSYNTHESIS BI: CPT | Performed by: FAMILY MEDICINE

## 2024-11-06 ENCOUNTER — HOSPITAL ENCOUNTER (EMERGENCY)
Facility: HOSPITAL | Age: 69
Discharge: HOME | End: 2024-11-06
Attending: EMERGENCY MEDICINE
Payer: MEDICARE

## 2024-11-06 ENCOUNTER — APPOINTMENT (OUTPATIENT)
Dept: GENERAL RADIOLOGY | Facility: HOSPITAL | Age: 69
End: 2024-11-06
Payer: MEDICARE

## 2024-11-06 ENCOUNTER — APPOINTMENT (OUTPATIENT)
Dept: CV DIAGNOSTICS | Facility: HOSPITAL | Age: 69
End: 2024-11-06
Attending: EMERGENCY MEDICINE
Payer: MEDICARE

## 2024-11-06 VITALS
OXYGEN SATURATION: 99 % | RESPIRATION RATE: 16 BRPM | SYSTOLIC BLOOD PRESSURE: 156 MMHG | BODY MASS INDEX: 29 KG/M2 | DIASTOLIC BLOOD PRESSURE: 92 MMHG | TEMPERATURE: 98 F | HEART RATE: 85 BPM | WEIGHT: 162.06 LBS

## 2024-11-06 DIAGNOSIS — R07.89 CHEST PAIN, ATYPICAL: Primary | ICD-10-CM

## 2024-11-06 LAB
ALBUMIN SERPL-MCNC: 4.4 G/DL (ref 3.2–4.8)
ALBUMIN/GLOB SERPL: 1.2 {RATIO} (ref 1–2)
ALP LIVER SERPL-CCNC: 80 U/L
ALT SERPL-CCNC: 31 U/L
ANION GAP SERPL CALC-SCNC: 1 MMOL/L (ref 0–18)
AST SERPL-CCNC: 34 U/L (ref ?–34)
ATRIAL RATE: 85 BPM
BASOPHILS # BLD AUTO: 0.06 X10(3) UL (ref 0–0.2)
BASOPHILS NFR BLD AUTO: 0.7 %
BILIRUB SERPL-MCNC: 0.4 MG/DL (ref 0.2–1.1)
BUN BLD-MCNC: 12 MG/DL (ref 9–23)
CALCIUM BLD-MCNC: 10.3 MG/DL (ref 8.7–10.4)
CHLORIDE SERPL-SCNC: 109 MMOL/L (ref 98–112)
CO2 SERPL-SCNC: 30 MMOL/L (ref 21–32)
CREAT BLD-MCNC: 0.72 MG/DL
D DIMER PPP FEU-MCNC: 0.31 UG/ML FEU (ref ?–0.68)
EGFRCR SERPLBLD CKD-EPI 2021: 91 ML/MIN/1.73M2 (ref 60–?)
EOSINOPHIL # BLD AUTO: 0.12 X10(3) UL (ref 0–0.7)
EOSINOPHIL NFR BLD AUTO: 1.4 %
ERYTHROCYTE [DISTWIDTH] IN BLOOD BY AUTOMATED COUNT: 13.3 %
GLOBULIN PLAS-MCNC: 3.6 G/DL (ref 2–3.5)
GLUCOSE BLD-MCNC: 122 MG/DL (ref 70–99)
HCT VFR BLD AUTO: 44.8 %
HGB BLD-MCNC: 15 G/DL
IMM GRANULOCYTES # BLD AUTO: 0.03 X10(3) UL (ref 0–1)
IMM GRANULOCYTES NFR BLD: 0.4 %
LYMPHOCYTES # BLD AUTO: 3.01 X10(3) UL (ref 1–4)
LYMPHOCYTES NFR BLD AUTO: 36.3 %
MCH RBC QN AUTO: 28.5 PG (ref 26–34)
MCHC RBC AUTO-ENTMCNC: 33.5 G/DL (ref 31–37)
MCV RBC AUTO: 85.2 FL
MONOCYTES # BLD AUTO: 0.76 X10(3) UL (ref 0.1–1)
MONOCYTES NFR BLD AUTO: 9.2 %
NEUTROPHILS # BLD AUTO: 4.31 X10 (3) UL (ref 1.5–7.7)
NEUTROPHILS # BLD AUTO: 4.31 X10(3) UL (ref 1.5–7.7)
NEUTROPHILS NFR BLD AUTO: 52 %
NT-PROBNP SERPL-MCNC: <35 PG/ML (ref ?–125)
OSMOLALITY SERPL CALC.SUM OF ELEC: 291 MOSM/KG (ref 275–295)
P AXIS: 38 DEGREES
P-R INTERVAL: 154 MS
PLATELET # BLD AUTO: 294 10(3)UL (ref 150–450)
POTASSIUM SERPL-SCNC: 4 MMOL/L (ref 3.5–5.1)
PROT SERPL-MCNC: 8 G/DL (ref 5.7–8.2)
Q-T INTERVAL: 364 MS
QRS DURATION: 86 MS
QTC CALCULATION (BEZET): 433 MS
R AXIS: -20 DEGREES
RBC # BLD AUTO: 5.26 X10(6)UL
SODIUM SERPL-SCNC: 140 MMOL/L (ref 136–145)
T AXIS: 26 DEGREES
TROPONIN I SERPL HS-MCNC: <3 NG/L
VENTRICULAR RATE: 85 BPM
WBC # BLD AUTO: 8.3 X10(3) UL (ref 4–11)

## 2024-11-06 PROCEDURE — 83880 ASSAY OF NATRIURETIC PEPTIDE: CPT | Performed by: EMERGENCY MEDICINE

## 2024-11-06 PROCEDURE — 80053 COMPREHEN METABOLIC PANEL: CPT | Performed by: EMERGENCY MEDICINE

## 2024-11-06 PROCEDURE — 71046 X-RAY EXAM CHEST 2 VIEWS: CPT

## 2024-11-06 PROCEDURE — 84484 ASSAY OF TROPONIN QUANT: CPT | Performed by: EMERGENCY MEDICINE

## 2024-11-06 PROCEDURE — 36415 COLL VENOUS BLD VENIPUNCTURE: CPT

## 2024-11-06 PROCEDURE — 93017 CV STRESS TEST TRACING ONLY: CPT | Performed by: EMERGENCY MEDICINE

## 2024-11-06 PROCEDURE — 99285 EMERGENCY DEPT VISIT HI MDM: CPT

## 2024-11-06 PROCEDURE — 93350 STRESS TTE ONLY: CPT | Performed by: EMERGENCY MEDICINE

## 2024-11-06 PROCEDURE — 93010 ELECTROCARDIOGRAM REPORT: CPT

## 2024-11-06 PROCEDURE — 85379 FIBRIN DEGRADATION QUANT: CPT | Performed by: EMERGENCY MEDICINE

## 2024-11-06 PROCEDURE — 85025 COMPLETE CBC W/AUTO DIFF WBC: CPT | Performed by: EMERGENCY MEDICINE

## 2024-11-06 PROCEDURE — 93005 ELECTROCARDIOGRAM TRACING: CPT

## 2024-11-06 PROCEDURE — 93018 CV STRESS TEST I&R ONLY: CPT | Performed by: EMERGENCY MEDICINE

## 2024-11-06 NOTE — ED PROVIDER NOTES
Patient Seen in: [facility] Emergency Department      History     Chief Complaint   Patient presents with    Chest Pain Angina    Difficulty Breathing     Stated Complaint: sob x 2 weeks and tightness to chest    Subjective:   HPI      69-year-old female who presents for evaluation of shortness of breath and chest discomfort.  She reports the shortness of breath has been going on for \"a while\".  She describes it as just feel like she does not get enough air when she takes of breath.  There are some days when she feels completely fine with no symptoms, somewhere she sort of feels it all day.  She does note in the last 2 days she has had intermittent substernal chest heaviness.  Not exertional, comes and goes randomly.  No diaphoresis or nausea.    Objective:     Past Medical History:    Asthma, mild intermittent (HCC)    Diaphragmatic hernia without mention of obstruction or gangrene    hiatal hernia on UGI x-ray    ECZEMA      Esophageal reflux    FATTY LIVER    GERD    Impaired fasting glucose    MENOPAUSE    Mixed hyperlipidemia    Nonspecific elevation of levels of transaminase or lactic acid dehydrogenase (LDH)    Osteoarthritis    OSTEOPENIA    OTHER DISEASES    VERTIGO    Primary osteoarthritis of left hip    Unspecified asthma(493.90)    Vitamin D deficiency              Past Surgical History:   Procedure Laterality Date    Card treadmill stress, adult (cpt=93017)  02/11/2021    Normal stress EKG.  Double product 53162.  Poor exercise tolerance.    Colonoscopy  10/06    diverticulosis, hemorrhoids; hyperplastic polyp    Colonoscopy  1/3/17    Normal; Dr. Saavedra    Ct calcium scoring  03/04/2020    Score is 47; mild coronary atherosclerosis    Ct heart w/ calcium scoring  2005    score 0    Ct heart w/ calcium scoring  2010    0.7 ([place])    Cystourethroscopy  12/2014    Benign    Exc skin benig 1.1-2cm trunk,arm,leg  8/10/2012    Procedure: EXCISION OF ABDOMINAL LIPOMA;  Surgeon: Ezio Orourke;   Location: [facility]    Layr clos wnd trunk,arm,leg <2.5cm  8/10/2012    Procedure: EXCISION OF ABDOMINAL LIPOMA;  Surgeon: Ezio Orourke;  Location: [facility]    Mammogram, screening, bilateral (cpt=77057)  3/9/13    benign finding - EH    Total hip replacement      Upper gi endoscopy,diagnosis  9/22/2009    hiatal hernia, gastric polyp    Xray upper gi air contrast+kub  2007    hiatal hernia and reflux                Social History     Socioeconomic History    Marital status:     Number of children: 1   Occupational History    Occupation: Retired    Tobacco Use    Smoking status: Never    Smokeless tobacco: Never   Vaping Use    Vaping status: Never Used   Substance and Sexual Activity    Alcohol use: Yes     Alcohol/week: 0.0 standard drinks of alcohol     Comment: 2-3 DRINKS MONTH    Drug use: No    Sexual activity: Yes     Partners: Male   Social History Narrative    . Retired. . Nonsmoker. No alcohol. No illicits. Nonstructed exercise. One child.    Medical power of  is , James Patel (3/22).                   Physical Exam     ED Triage Vitals   BP 11/06/24 1237 (!) 167/92   Pulse 11/06/24 1234 110   Resp 11/06/24 1234 18   Temp 11/06/24 1237 98.4 °F (36.9 °C)   Temp src 11/06/24 1237 Oral   SpO2 11/06/24 1234 99 %   O2 Device 11/06/24 1237 None (Room air)       Current Vitals:   Vital Signs  BP: (!) 156/92  Pulse: 85  Resp: 16  Temp: 98.4 °F (36.9 °C)  Temp src: Oral  MAP (mmHg): (!) 112    Oxygen Therapy  SpO2: 99 %  O2 Device: None (Room air)        Physical Exam  Vitals and nursing note reviewed.   Constitutional:       Appearance: She is well-developed.   HENT:      Head: Normocephalic and atraumatic.   Eyes:      Conjunctiva/sclera: Conjunctivae normal.      Pupils: Pupils are equal, round, and reactive to light.   Cardiovascular:      Rate and Rhythm: Normal rate and regular rhythm.       Heart sounds: Normal heart sounds.   Pulmonary:      Effort: Pulmonary effort is normal.      Breath sounds: Normal breath sounds.      Comments: Clear to auscultation bilaterally with good aeration.  No distress.  Abdominal:      General: Bowel sounds are normal.      Palpations: Abdomen is soft.   Musculoskeletal:         General: Normal range of motion.      Cervical back: Normal range of motion and neck supple.   Skin:     General: Skin is warm and dry.   Neurological:      Mental Status: She is alert and oriented to person, place, and time.             ED Course     Labs Reviewed   COMP METABOLIC PANEL (14) - Abnormal; Notable for the following components:       Result Value    Glucose 122 (*)     AST 34 (*)     Globulin  3.6 (*)     All other components within normal limits   TROPONIN I HIGH SENSITIVITY - Normal   PRO BETA NATRIURETIC PEPTIDE - Normal   D-DIMER - Normal   CBC WITH DIFFERENTIAL WITH PLATELET   RAINBOW DRAW LAVENDER   RAINBOW DRAW LIGHT GREEN   RAINBOW DRAW BLUE     EKG    Rate, intervals and axes as noted on EKG Report.  Rate: 85  Rhythm: Sinus Rhythm  Readin.  No ST segment or T wave changes.  No old is available for comparison.             Heart Score:    HEART Score      Title      Criteria Score   Age: 65 and older Age Score: 2   History: Mod Suspicious Hx Score: 1     EKG: Normal EKG Score: 0   HTN: No   Hypercholesterolemia: Yes   Atherosclerosis/PVD: No     DM: No   BMI>30kg/m2: No   Smoking: No   Family History: No         Other Risk Factor Score: 1             Lab Results   Component Value Date    TROPHS <3 2024           HEART Score: 4        Risk of adverse cardiac event is 12-16.6%           Impression    IMPRESSION:  1.  Coronary calcium score: 153 .  2.  High cardiovascular disease risk.  Narrative    DATE OF SERVICE: 2024  CT HEART CALCIUM SCORING  ORDERING HEALTHCARE PROVIDER: WILBER LAWRENCE  TECHNIQUE: 2.5 mm thick axial images were obtained through the heart,  with a narrow  field-of-view and EKG gating. Using the independent workstation, the calcium score was calculated.    COMPARISON:  2 view chest x-ray: 2023  CT abdomen pelvis: 2014  FINDINGS:    Coronary calcium score:  153.    Calcium score indicates moderate coronary plaque burden with high cardiovascular disease risk.    Percentile rank: 70th.  ---  Individual coronary artery calcium scores as follows:    -Left main artery: 0  -Left anterior descending artery: 143  -Left circumflex artery: 0  -Right coronary artery: 10  -Posterior descending artery:  0.  -Dia  PLB Total:     0  Ramus:     0  Other:     0  ---  Other findings:    Stable right lower lobe nodule   No suspicious pulmonary nodule (>2mm) in the imaged/visualized portions of the lungs.   view:  Unremarkable visualized portions of lungs.  ---  Procedure Note    Isai Najera MD - 2024  Formatting of this note might be different from the original.  DATE OF SERVICE: 2024  CT HEART CALCIUM SCORING  ORDERING HEALTHCARE PROVIDER: WILBER LAWRENCE  TECHNIQUE: 2.5 mm thick axial images were obtained through the heart, with a narrow  field-of-view and EKG gating. Using the independent workstation, the calcium score was calculated.    COMPARISON:  2 view chest x-ray: 2023  CT abdomen pelvis: 2014  FINDINGS:    Coronary calcium score:  153.    Calcium score indicates moderate coronary plaque burden with high cardiovascular disease risk.    Percentile rank: 70th.  ---  Individual coronary artery calcium scores as follows:    -Left main artery: 0  -Left anterior descending artery: 143  -Left circumflex artery: 0  -Right coronary artery: 10  -Posterior descending artery:  0.  -Dia  PLB Total:     0  Ramus:     0  Other:     0  ---  Other findings:    Stable right lower lobe nodule   No suspicious pulmonary nodule (>2mm) in the imaged/visualized portions of the lungs.   view:  Unremarkable visualized portions of  lungs.  ---  =====  IMPRESSION:  1.  Coronary calcium score: 153 .  2.  High cardiovascular disease risk.  Exam End: 11/02/24 09:40    Specimen Collected: 11/04/24 07:41           XR CHEST PA + LAT CHEST (CPT=71046)    Result Date: 11/6/2024  PROCEDURE:  XR CHEST PA + LAT CHEST (CPT=71046)  INDICATIONS:  sob x 2 weeks and tightness to chest  COMPARISON:  [facility], XR, XR CHEST PA + LAT CHEST (HGY=57234), 8/18/2013, 8:56 AM.  TECHNIQUE:  PA and lateral chest radiographs were obtained.   FINDINGS:  LUNGS:  Lung fields are clear.  No focal infiltrate or pulmonary edema. CARDIAC:  Heart size and vascularity are normal. MEDIASTINUM:  No mediastinal widening.  Mild atheromatous plaque noted in the aorta. PLEURA:  No pneumothorax.  No pleural effusions. BONES:  No acute osseous abnormality identified.            CONCLUSION:  No acute cardiopulmonary abnormality identified.   LOCATION:  [place]   Dictated by (CST): Jonh Alonso MD on 11/06/2024 at 1:59 PM     Finalized by (CST): Jonh Alonso MD on 11/06/2024 at 2:00 PM          Stress echo per Dr. Zamora of cardiology is unremarkable.         MDM      Pleasant 69-year-old female with history of high cholesterol presenting for evaluation of intermittent shortness of breath, intermittent chest heaviness.  Shortness of breath has been intermittent for a while, the chest pain is new in the last 2 days.  Intermittent but not consistently exertional.   states that she did have a heart scan just a few days ago, not because of the symptoms but just coincidentally so we will try to find that outpatient results.  Labs ordered to evaluate for ACS, D-dimer to rule out PE.      Update at 3:20 PM.  Initial troponin levels D-dimer negative.  Chest x-ray is normal.  Discussed case with Dr. Zamora of cardiology.  Reviewed symptoms, lab findings as well as recent abnormal calcium score.  Recommend stress echo from here in the ER, if normal patient can be  discharged home with outpatient follow-up.  Patient comfortable with the plan, she is hoping to go home.      Update at 5:15 PM.  Stress echo is unremarkable.  Discussed case with Dr. Zamora.  Cardiology office will call patient tomorrow to make a outpatient follow-up appointment.  He does recommend daily baby aspirin and the patient will start taking that.  Also recommends daily statin although the patient has tried that in the past and was unable to tolerate it, currently taking 1 twice weekly.  Will have her continue that until seen for follow-up.        Past Medical History-high cholesterol    Differential diagnosis before testing included ACS, PE    Co-morbidities that add to the complexity of management include: None    Testing ordered during this visit included labs, EKG, chest x-ray, stress echo    Radiographic images  I personally reviewed the radiographs and my individual interpretation shows no infiltrate or pneumothorax  I also reviewed the official reports that showed no infiltrate or pneumothorax    External chart review showed reviewed outpatient heart scan result    History obtained by an independent source included from  at bedside    Discussion of management with cardiology            Disposition:          Discharge  I have discussed with the patient the results of test, differential diagnosis, treatment plan, warning signs and symptoms which should prompt immediate return.  They expressed understanding of these instructions and agrees to the following plan provided.  They were given written discharge instructions and agrees to return for any concerns and voiced understanding and all questions were answered.    Medical Decision Making      Disposition and Plan     Clinical Impression:  1. Chest pain, atypical         Disposition:  Discharge  11/6/2024  5:17 pm    Follow-up:  Hipolito Zamora MD  [address]  [address]  [facility] [id number] [phone]    Follow up on  11/22/2024  1240          Medications Prescribed:  Current Discharge Medication List              Supplementary Documentation:

## 2024-11-06 NOTE — DISCHARGE INSTRUCTIONS
Cardiology office will be in touch to get you in for follow-up.  Start taking baby aspirin (81 mg) once daily.

## 2024-11-06 NOTE — PROGRESS NOTES
Stress echo ordered by ER completed.  Patient walked 9:00 min modified charissa stopping d/t fatique; denying cardiac symptoms.  APMHR 98%; echo images pending.

## 2024-11-06 NOTE — CONSULTS
[facility] Cardiology  Consultation Note      Mariangel Patel Patient Status:  Emergency    3/6/1955 MRN [id number]   Location [facility] EMERGENCY DEPARTMENT Attending Serafin Tanner MD   Hosp Day # 0 PCP Christy Johnson MD     Impression:  1. exertional dyspnea, single episode of chest pain, recent CAC = 153    Recommendations:  - ex stress echo: negative for inducible ischemia. ECG/HS trop re-assuring. OK for dc from cv standpoint; will arrange for outpatient f/u with me 2-4 weeks  - asa/rosuvastatin.    - discussed with Dr. Tanner.          History of Present Illness:  Mariangel Patel is a 69 year old female who presented to [facility] on 2024.      Seen in cardiology consultation for chest pain/MASTERS. Recent CAC = 153 (24) recommended establishing with cardiology.  In waiting, she has experienced mild MASTERS and yesterday had an episode of mild chest pain, anxious, came to ER.  ECG SR nl repolarization.  ex stress echo; pt exercised 9 minutes without ECG changes/CP, normal wall motion with exercise stress.      Medications:  No current facility-administered medications for this encounter.       Past Medical History:    Asthma, mild intermittent (HCC)    Diaphragmatic hernia without mention of obstruction or gangrene    hiatal hernia on UGI x-ray    ECZEMA      Esophageal reflux    FATTY LIVER    GERD    Impaired fasting glucose    MENOPAUSE    Mixed hyperlipidemia    Nonspecific elevation of levels of transaminase or lactic acid dehydrogenase (LDH)    Osteoarthritis    OSTEOPENIA    OTHER DISEASES    VERTIGO    Primary osteoarthritis of left hip    Unspecified asthma(493.90)    Vitamin D deficiency       Past Surgical History:   Procedure Laterality Date    Card treadmill stress, adult (cpt=93017)  2021    Normal stress EKG.  Double product 12763.  Poor exercise tolerance.    Colonoscopy  10/06    diverticulosis, hemorrhoids; hyperplastic polyp    Colonoscopy  1/3/17     Normal; Dr. Saavedra    Ct calcium scoring  03/04/2020    Score is 47; mild coronary atherosclerosis    Ct heart w/ calcium scoring  2005    score 0    Ct heart w/ calcium scoring  2010    0.7 ([place])    Cystourethroscopy  12/2014    Benign    Exc skin benig 1.1-2cm trunk,arm,leg  8/10/2012    Procedure: EXCISION OF ABDOMINAL LIPOMA;  Surgeon: Ezio Orourke;  Location: [facility]    Layr clos wnd trunk,arm,leg <2.5cm  8/10/2012    Procedure: EXCISION OF ABDOMINAL LIPOMA;  Surgeon: Ezio Orourke;  Location: [facility]    Mammogram, screening, bilateral (cpt=77057)  3/9/13    benign finding - EH    Total hip replacement      Upper gi endoscopy,diagnosis  9/22/2009    hiatal hernia, gastric polyp    Xray upper gi air contrast+kub  2007    hiatal hernia and reflux       Family History  family history includes Dementia in her mother; Diabetes in her father; Heart Disorder in her father; Lipids in her mother; Macular degeneration in her mother; Other in her maternal grandmother.    Social History   reports that she has never smoked. She has never used smokeless tobacco. She reports current alcohol use. She reports that she does not use drugs.     Allergies  Allergies[1]      Review of Systems:  Constitutional: negative for fevers  Eyes: negative for visual disturbance  Ears, nose, mouth, throat, and face: negative for epistaxis  Respiratory: negative for dyspnea on exertion  Cardiovascular: negative for chest pain  Gastrointestinal: negative for melena  Genitourinary:negative for hematuria  Hematologic/lymphatic: negative for bleeding  Musculoskeletal:negative for myalgias  Neurological: negative for dizziness and headaches  Endocrine: negative for temperature intolerance      Physical Exam:  Blood pressure (!) 156/92, pulse 85, temperature 98.4 °F (36.9 °C), temperature source Oral, resp. rate 16, weight 162 lb 0.6 oz (73.5 kg), last menstrual period 05/29/2009, SpO2 99%, not currently  breastfeeding.  Temp (24hrs), Av.4 °F (36.9 °C), Min:98.4 °F (36.9 °C), Max:98.4 °F (36.9 °C)    Wt Readings from Last 3 Encounters:   24 162 lb 0.6 oz (73.5 kg)   22 167 lb (75.8 kg)   22 165 lb (74.8 kg)       General: Awake and alert; in no acute distress  HEENT: Extraocular movements are intact; sclerae are anicteric; scalp is atrauamatic; no thyromegaly  Neck: Supple; no JVD; no carotid bruits  Cardiac: Regular rate and regular rhythm; no murmurs/rubs/gallops are appreciated; PMI is non-displaced; there is no evidence of a sternal heave  Lungs: Clear to auscultation bilaterally; no accessory muscle use is noted  Abdomen: Soft, non-tender; bowel sounds are normoactive; no hepatosplenomegaly  Extremities: No clubbing or cyanosis; moves all 4 extremities normally  Psychiatric: Normal mood and affect; answers questions appropriately  Dermatologic: No rashes; normal skin turgor    Diagnostic testing:    EKG: Normal sinus rhythm    Labs:   Lab Results   Component Value Date    INR 0.9 2019        Lab Results   Component Value Date    WBC 8.3 2024    HGB 15.0 2024    HCT 44.8 2024    .0 2024    CREATSERUM 0.72 2024    BUN 12 2024     2024    K 4.0 2024     2024    CO2 30.0 2024     2024    CA 10.3 2024    ALB 4.4 2024    ALKPHO 80 2024    BILT 0.4 2024    TP 8.0 2024    AST 34 2024    ALT 31 2024    DDIMER 0.31 2024         Thank you for allowing our practice to participate in the care of your patient. Please do not hesitate to contact me if you have any questions.    Hipolito Zamora MD  2024  4:28 PM         [1]   Allergies  Allergen Reactions    Levaquin [Levofloxacin]      Joint pains    Pcn [Bicillin L-A] UNKNOWN     Recently used for URI with no reaction

## 2025-03-18 ENCOUNTER — HOSPITAL ENCOUNTER (OUTPATIENT)
Dept: BONE DENSITY | Age: 70
Discharge: HOME | End: 2025-03-18
Attending: FAMILY MEDICINE
Payer: MEDICARE

## 2025-03-18 DIAGNOSIS — M85.80 OSTEOPENIA: ICD-10-CM

## 2025-03-18 PROCEDURE — 77080 DXA BONE DENSITY AXIAL: CPT | Performed by: FAMILY MEDICINE

## 2025-08-04 ENCOUNTER — HOSPITAL ENCOUNTER (OUTPATIENT)
Dept: MAMMOGRAPHY | Age: 70
Discharge: HOME | End: 2025-08-04
Attending: FAMILY MEDICINE

## 2025-08-04 DIAGNOSIS — Z12.31 ENCOUNTER FOR SCREENING MAMMOGRAM FOR MALIGNANT NEOPLASM OF BREAST: ICD-10-CM

## 2025-08-04 PROCEDURE — 77063 BREAST TOMOSYNTHESIS BI: CPT | Performed by: FAMILY MEDICINE

## 2025-08-04 PROCEDURE — 77067 SCR MAMMO BI INCL CAD: CPT | Performed by: FAMILY MEDICINE

## (undated) DEVICE — STERILE POLYISOPRENE POWDER-FREE SURGICAL GLOVES: Brand: PROTEXIS

## (undated) DEVICE — SUTURE ETHIBOND 5 CCS

## (undated) DEVICE — SUTURE VICRYL 2-0 CP-1

## (undated) DEVICE — Device: Brand: STABLECUT®

## (undated) DEVICE — HOOD, PEEL-AWAY: Brand: FLYTE

## (undated) DEVICE — WRAP COOLING HIP W/ICE PILLOW

## (undated) DEVICE — Device: Brand: PLUMEPEN

## (undated) DEVICE — DECANTER BAG 9": Brand: MEDLINE INDUSTRIES, INC.

## (undated) DEVICE — SHEET,DRAPE,70X100,STERILE: Brand: MEDLINE

## (undated) DEVICE — CODMAN® INTEGRATED BIPOLAR CORD AND TUBING SET FLYING LEADS, ROTARY PUMP: Brand: CODMAN®

## (undated) DEVICE — PADDING CAST COTTON  4

## (undated) DEVICE — Device

## (undated) DEVICE — ANTERIOR HIP: Brand: MEDLINE INDUSTRIES, INC.

## (undated) DEVICE — GOWN SURG AERO CHROME XXL

## (undated) DEVICE — SPECIMEN CONTAINER,POSITIVE SEAL INDICATOR, OR PACKAGED: Brand: PRECISION

## (undated) DEVICE — PILLOW ABDUCTION HIP SM

## (undated) DEVICE — DRAPE C-ARM UNIVERSAL

## (undated) DEVICE — KENDALL SCD EXPRESS SLEEVES, KNEE LENGTH, MEDIUM: Brand: KENDALL SCD

## (undated) DEVICE — SUTURE VICRYL 1 CPX

## (undated) DEVICE — DRESSING AQUACEL AG 3.5 X 10

## (undated) DEVICE — BLADE ELECTRODE: Brand: EDGE

## (undated) DEVICE — GLOVE RADIATION SZ 8-1/2

## (undated) NOTE — LETTER
[facility]  Phone: [phone]  Right Fax: [phone]  Dash 20 Janae Myers RN Date: 6/10/19    Patient Name: More Janis  Surgery Date: 6/24/2019    CSN: [id number]  Medical Record: [id number]   D